# Patient Record
Sex: MALE | Race: WHITE | URBAN - METROPOLITAN AREA
[De-identification: names, ages, dates, MRNs, and addresses within clinical notes are randomized per-mention and may not be internally consistent; named-entity substitution may affect disease eponyms.]

---

## 2017-01-10 ENCOUNTER — ANESTHESIA EVENT (OUTPATIENT)
Dept: SURGERY | Facility: CLINIC | Age: 77
End: 2017-01-10
Payer: MEDICARE

## 2017-01-10 ENCOUNTER — ANESTHESIA (OUTPATIENT)
Dept: SURGERY | Facility: CLINIC | Age: 77
End: 2017-01-10
Payer: MEDICARE

## 2017-01-10 PROCEDURE — 25800025 ZZH RX 258: Performed by: NURSE ANESTHETIST, CERTIFIED REGISTERED

## 2017-01-10 PROCEDURE — 25000125 ZZHC RX 250: Performed by: NURSE ANESTHETIST, CERTIFIED REGISTERED

## 2017-01-10 PROCEDURE — 25000128 H RX IP 250 OP 636: Performed by: NURSE ANESTHETIST, CERTIFIED REGISTERED

## 2017-01-10 RX ORDER — ONDANSETRON 2 MG/ML
INJECTION INTRAMUSCULAR; INTRAVENOUS PRN
Status: DISCONTINUED | OUTPATIENT
Start: 2017-01-10 | End: 2017-01-10

## 2017-01-10 RX ORDER — FENTANYL CITRATE 50 UG/ML
INJECTION, SOLUTION INTRAMUSCULAR; INTRAVENOUS PRN
Status: DISCONTINUED | OUTPATIENT
Start: 2017-01-10 | End: 2017-01-10

## 2017-01-10 RX ADMIN — SODIUM CHLORIDE, SODIUM LACTATE, POTASSIUM CHLORIDE, CALCIUM CHLORIDE: 600; 310; 30; 20 INJECTION, SOLUTION INTRAVENOUS at 11:43

## 2017-01-10 RX ADMIN — DEXMEDETOMIDINE 4 MCG: 100 INJECTION, SOLUTION, CONCENTRATE INTRAVENOUS at 11:54

## 2017-01-10 RX ADMIN — DEXMEDETOMIDINE 12 MCG: 100 INJECTION, SOLUTION, CONCENTRATE INTRAVENOUS at 11:46

## 2017-01-10 RX ADMIN — ONDANSETRON 4 MG: 2 INJECTION INTRAMUSCULAR; INTRAVENOUS at 11:54

## 2017-01-10 RX ADMIN — FENTANYL CITRATE 25 MCG: 50 INJECTION, SOLUTION INTRAMUSCULAR; INTRAVENOUS at 11:53

## 2017-01-10 NOTE — ANESTHESIA POSTPROCEDURE EVALUATION
Patient: Obie Boone    PHACOEMULSIFICATION CLEAR CORNEA WITH STANDARD INTRAOCULAR LENS IMPLANT (Left Eye)  Additional InformationProcedure(s):  COMPLEX LEFT EYE FEMTOSECOND LASER ASSISTED, PHACOEMULSIFICATION CLEAR CORNEA WITH STANDARD INTRAOCULAR LENS IMPLANT  - Wound Class: I-Clean    Diagnosis:LEFT EYE CATARACT  Diagnosis Additional Information: No value filed.    Anesthesia Type:  MAC    Note:  Anesthesia Post Evaluation    Patient location during evaluation: PACU  Patient participation: Able to fully participate in evaluation  Level of consciousness: awake  Pain management: adequate  Airway patency: patent  Cardiovascular status: acceptable  Respiratory status: acceptable  Hydration status: acceptable     Anesthetic complications: None          Last vitals:  Filed Vitals:    01/10/17 1042 01/10/17 1215 01/10/17 1226   BP: 136/82 107/78 102/76   Temp: 36.3  C (97.4  F)     Resp: 16 16 16   SpO2: 97% 98% 96%       Electronically Signed By: Yamel Mayen  January 10, 2017  2:12 PM

## 2017-01-10 NOTE — ANESTHESIA CARE TRANSFER NOTE
Patient: Obie Boone    PHACOEMULSIFICATION CLEAR CORNEA WITH STANDARD INTRAOCULAR LENS IMPLANT (Left Eye)  Additional InformationProcedure(s):  COMPLEX LEFT EYE FEMTOSECOND LASER ASSISTED, PHACOEMULSIFICATION CLEAR CORNEA WITH STANDARD INTRAOCULAR LENS IMPLANT  - Wound Class: I-Clean    Diagnosis: LEFT EYE CATARACT  Diagnosis Additional Information: No value filed.    Anesthesia Type:   MAC     Note:      Comments:     Transferred to EC PACU RN. Patient awake and verbal. Spontaneous resp and on room air. Monitors and alarms on. VSS. Report given.      Vitals: (Last set prior to Anesthesia Care Transfer)              Electronically Signed By: DUNIA Aparicio CRNA  January 10, 2017  12:18 PM

## 2017-06-17 ENCOUNTER — HEALTH MAINTENANCE LETTER (OUTPATIENT)
Age: 77
End: 2017-06-17

## 2017-10-10 ENCOUNTER — OFFICE VISIT (OUTPATIENT)
Dept: FAMILY MEDICINE | Facility: CLINIC | Age: 77
End: 2017-10-10
Payer: MEDICARE

## 2017-10-10 VITALS — SYSTOLIC BLOOD PRESSURE: 116 MMHG | TEMPERATURE: 97.9 F | DIASTOLIC BLOOD PRESSURE: 66 MMHG

## 2017-10-10 DIAGNOSIS — Z71.84 COUNSELING ABOUT TRAVEL: Primary | ICD-10-CM

## 2017-10-10 DIAGNOSIS — Z23 VACCINE FOR TYPHOID-PARATYPHOID ALONE (TAB): ICD-10-CM

## 2017-10-10 PROCEDURE — 99402 PREV MED CNSL INDIV APPRX 30: CPT | Mod: GA | Performed by: FAMILY MEDICINE

## 2017-10-10 RX ORDER — IPRATROPIUM BROMIDE 21 UG/1
2 SPRAY, METERED NASAL EVERY 12 HOURS
COMMUNITY

## 2017-10-10 RX ORDER — ATOVAQUONE AND PROGUANIL HYDROCHLORIDE 250; 100 MG/1; MG/1
TABLET, FILM COATED ORAL
Qty: 20 TABLET | Refills: 0 | Status: SHIPPED | OUTPATIENT
Start: 2017-10-10

## 2017-10-10 RX ORDER — AZITHROMYCIN 500 MG/1
TABLET, FILM COATED ORAL
Qty: 3 TABLET | Refills: 0 | Status: SHIPPED | OUTPATIENT
Start: 2017-10-10

## 2017-10-10 RX ORDER — FINASTERIDE 5 MG/1
5 TABLET, FILM COATED ORAL DAILY
COMMUNITY

## 2017-10-10 NOTE — MR AVS SNAPSHOT
After Visit Summary   10/10/2017    Obie Boone    MRN: [id number]           Patient Information     Date Of Birth          1940        Visit Information        Provider Department      10/10/2017 10:15 AM My Merrill, DO [facility]        Today's Diagnoses     Counseling about travel    -  1    Vaccine for typhoid-paratyphoid alone (TAB)           Follow-ups after your visit        Who to contact     If you have questions or need follow up information about today's clinic visit or your schedule please contact [facility] directly at [phone].  Normal or non-critical lab and imaging results will be communicated to you by [employer], letter or phone within 4 business days after the clinic has received the results. If you do not hear from us within 7 days, please contact the clinic through [employer] or phone. If you have a critical or abnormal lab result, we will notify you by phone as soon as possible.  Submit refill requests through [employer] or call your pharmacy and they will forward the refill request to us. Please allow 3 business days for your refill to be completed.          Additional Information About Your Visit        MyChart Information     [employer] gives you secure access to your electronic health record. If you see a primary care provider, you can also send messages to your care team and make appointments. If you have questions, please call your primary care clinic.  If you do not have a primary care provider, please call [phone] and they will assist you.        Care EveryWhere ID     This is your Care EveryWhere ID. This could be used by other organizations to access your [place] medical records  [id number]        Your Vitals Were     Temperature                   97.9  F (36.6  C) (Oral)            Blood Pressure from Last 3 Encounters:   10/10/17 116/66   01/10/17 102/76   11/24/15 116/81    Weight from Last 3 Encounters:   01/10/17 246 lb (111.6  kg)   11/24/15 247 lb (112 kg)   10/22/12 251 lb 4.8 oz (114 kg)              Today, you had the following     No orders found for display         Today's Medication Changes          These changes are accurate as of: 10/10/17  5:00 PM.  If you have any questions, ask your nurse or doctor.               Start taking these medicines.        Dose/Directions    atovaquone-proguanil 250-100 MG per tablet   Commonly known as:  MALARONE   Used for:  Counseling about travel   Started by:  My Merrill DO        Take one tablet daily, start 1 day prior to travel to malaria area and continue daily while there and for 7 days after leaving malaria area   Quantity:  20 tablet   Refills:  0       azithromycin 500 MG tablet   Commonly known as:  ZITHROMAX   Used for:  Counseling about travel   Started by:  My Merrill DO        Take one tablet daily for up to 3 days as needed for traveler's diarrhea   Quantity:  3 tablet   Refills:  0       typhoid CR capsule   Commonly known as:  VIVOTIF   Used for:  Vaccine for typhoid-paratyphoid alone (TAB)   Started by:  My Merrill DO        Dose:  1 capsule   Take 1 capsule by mouth every other day   Quantity:  4 capsule   Refills:  0            Where to get your medicines      Some of these will need a paper prescription and others can be bought over the counter.  Ask your nurse if you have questions.     Bring a paper prescription for each of these medications     atovaquone-proguanil 250-100 MG per tablet    azithromycin 500 MG tablet    typhoid CR capsule                Primary Care Provider Office Phone # Fax #    Carmen Cota PA-C [phone]       [facility] [address] [place] [address]        Equal Access to Services     [facility]: Hadkatt jensen Somarcela, waaxda luqadaha, qaybta kaalaugusta mccoy. So [facility] [phone].    ATENCIÓN: Si jocela español, tiene a cramer disposición servicios  pretty de asistencia lingüística. Radha mckeon [phone].    We comply with applicable federal civil rights laws and [place] laws. We do not discriminate on the basis of race, color, national origin, age, disability, sex, sexual orientation, or gender identity.            Thank you!     Thank you for choosing [facility]  for your care. Our goal is always to provide you with excellent care. Hearing back from our patients is one way we can continue to improve our services. Please take a few minutes to complete the written survey that you may receive in the mail after your visit with us. Thank you!             Your Updated Medication List - Protect others around you: Learn how to safely use, store and throw away your medicines at www.disposemymeds.org.          This list is accurate as of: 10/10/17  5:00 PM.  Always use your most recent med list.                   Brand Name Dispense Instructions for use Diagnosis    ASPIRIN PO      Take 325 mg by mouth daily        atovaquone-proguanil 250-100 MG per tablet    MALARONE    20 tablet    Take one tablet daily, start 1 day prior to travel to malaria area and continue daily while there and for 7 days after leaving malaria area    Counseling about travel       azithromycin 500 MG tablet    ZITHROMAX    3 tablet    Take one tablet daily for up to 3 days as needed for traveler's diarrhea    Counseling about travel       benazepril 20 MG tablet    LOTENSIN    90 tablet    Take 1 tablet by mouth daily.    Essential hypertension, benign       finasteride 5 MG tablet    PROSCAR     Take 5 mg by mouth daily        ipratropium 0.03 % spray    ATROVENT     Spray 2 sprays into both nostrils every 12 hours        * levothyroxine 175 MCG tablet    SYNTHROID/LEVOTHROID    45 tablet    Take 1 tablet by mouth every other day.    Unspecified hypothyroidism       * levothyroxine 200 MCG tablet    SYNTHROID/LEVOTHROID    45 tablet    Take 1 tablet by mouth every other day.     Unspecified hypothyroidism       MELATONIN PO           NIFEdipine ER osmotic 90 MG 24 hr tablet    PROCARDIA XL    90 tablet    Take 1 tablet (90 mg) by mouth daily    Essential hypertension, benign, Raynaud's phenomenon       PANTOPRAZOLE SODIUM PO           TAMSULOSIN HCL PO      Take 0.4 mg by mouth daily        typhoid CR capsule    VIVOTIF    4 capsule    Take 1 capsule by mouth every other day    Vaccine for typhoid-paratyphoid alone (TAB)       vitamin D 2000 UNITS tablet      Take 2,000 Units by mouth daily.    Vitamin D deficiency       * Notice:  This list has 2 medication(s) that are the same as other medications prescribed for you. Read the directions carefully, and ask your doctor or other care provider to review them with you.

## 2017-10-10 NOTE — PROGRESS NOTES
Itinerary:  [facility]    Departure Date: 01/16/2018    Return Date: 01/28/2018    Length of Trip: 12 days    Purpose of Trip: mission    Urban/Rural: urban    Accommodations: Hotel    IMMUNIZATION HISTORY  Have you received any immunizations within the past 4 weeks?  No  Have you ever fainted from having your blood drawn or from an injection?  No  Have you ever had a fever reaction to vaccination?  No  Have you ever had any bad reaction or side effect from any vaccination?  No  Have you ever had hepatitis A or B vaccine?  Yes  Do you live (or work closely) with anyone who has AIDS, an AIDS-like condition, any other immune disorder or who is on chemotherapy for cancer or a   family history of immunodeficiency?  No  Have you received any injection of immune globulin or any blood products during the past 12 months?  No    Patient roomed by Grazyna Liu CMA      Special medical concerns: none    /66  Temp 97.9  F (36.6  C) (Oral)  EXAM: deferred    Immunizations discussed include: oral typhoid   will get flu shot at PCP and possibly Hep B  Malaraia prophylaxis recommended: Malarone  Symptomatic treatment for traveler's diarrhea: azithromycin    ASSESSMENT/PLAN:  1. Counseling about travel     - atovaquone-proguanil (MALARONE) 250-100 MG per tablet; Take one tablet daily, start 1 day prior to travel to malaria area and continue daily while there and for 7 days after leaving malaria area  Dispense: 20 tablet; Refill: 0  - azithromycin (ZITHROMAX) 500 MG tablet; Take one tablet daily for up to 3 days as needed for traveler's diarrhea  Dispense: 3 tablet; Refill: 0    2. Vaccine for typhoid-paratyphoid alone (TAB)     - typhoid (VIVOTIF) CR capsule; Take 1 capsule by mouth every other day  Dispense: 4 capsule; Refill: 0  I have reviewed general recommendations for safe travel   including: food/water precautions, insect avoidance,    roadway safety. Educational materials and [employer] report provided.    Total visit time 60  minutes (2 family members) with over 50% of time spent counseling patient.

## 2017-10-10 NOTE — NURSING NOTE
"Chief Complaint   Patient presents with     Travel Clinic     /66  Temp 97.9  F (36.6  C) (Oral) Estimated body mass index is 27.02 kg/(m^2) as calculated from the following:    Height as of 1/10/17: 6' 8\" (2.032 m).    Weight as of 1/10/17: 246 lb (111.6 kg).  Medication Reconciliation: complete      Health Maintenance due pending provider review:  NONE    n/a    Grazyna Liu CMA  "

## 2018-02-12 NOTE — ANESTHESIA PREPROCEDURE EVALUATION
Anesthesia Evaluation     . Pt has had prior anesthetic.     No history of anesthetic complications     ROS/MED HX    ENT/Pulmonary: Comment: Pulmonary embolism     (-) sleep apnea   Neurologic:       Cardiovascular:     (+) hypertension----. : . . . :. .       METS/Exercise Tolerance:     Hematologic:         Musculoskeletal:         GI/Hepatic:     (+) GERD Asymptomatic on medication,       Renal/Genitourinary:     (+) chronic renal disease,       Endo:     (+) thyroid problem .      Psychiatric:         Infectious Disease:         Malignancy:   (+) Malignancy (melanoma)           Other: Comment: raynauds              Physical Exam  Normal systems: dental    Airway   Mallampati: I  TM distance: >3 FB  Neck ROM: full    Dental     Cardiovascular   Rhythm and rate: regular      Pulmonary    breath sounds clear to auscultation                    Anesthesia Plan      History & Physical Review  History and physical reviewed and following examination; no interval change.    ASA Status:  2 .        Plan for MAC with Intravenous induction.   PONV prophylaxis:  Ondansetron (or other 5HT-3)       Postoperative Care      Consents                          .  
18

## 2019-12-15 ENCOUNTER — HEALTH MAINTENANCE LETTER (OUTPATIENT)
Age: 79
End: 2019-12-15

## 2021-01-15 ENCOUNTER — HEALTH MAINTENANCE LETTER (OUTPATIENT)
Age: 81
End: 2021-01-15

## 2021-09-04 ENCOUNTER — HEALTH MAINTENANCE LETTER (OUTPATIENT)
Age: 81
End: 2021-09-04

## 2022-02-19 ENCOUNTER — HEALTH MAINTENANCE LETTER (OUTPATIENT)
Age: 82
End: 2022-02-19

## 2022-10-22 ENCOUNTER — HEALTH MAINTENANCE LETTER (OUTPATIENT)
Age: 82
End: 2022-10-22

## 2023-04-01 ENCOUNTER — HEALTH MAINTENANCE LETTER (OUTPATIENT)
Age: 83
End: 2023-04-01